# Patient Record
Sex: FEMALE | Race: WHITE
[De-identification: names, ages, dates, MRNs, and addresses within clinical notes are randomized per-mention and may not be internally consistent; named-entity substitution may affect disease eponyms.]

---

## 2017-02-28 ENCOUNTER — HOSPITAL ENCOUNTER (OUTPATIENT)
Dept: HOSPITAL 19 - ZCOL.LAB | Age: 82
End: 2017-02-28
Attending: INTERNAL MEDICINE
Payer: MEDICARE

## 2017-02-28 DIAGNOSIS — R19.7: Primary | ICD-10-CM

## 2017-07-06 ENCOUNTER — HOSPITAL ENCOUNTER (OUTPATIENT)
Dept: HOSPITAL 19 - MC.RAD | Age: 82
End: 2017-07-06
Attending: INTERNAL MEDICINE
Payer: MEDICARE

## 2017-07-06 DIAGNOSIS — Z12.31: Primary | ICD-10-CM

## 2019-03-13 ENCOUNTER — HOSPITAL ENCOUNTER (OUTPATIENT)
Dept: HOSPITAL 19 - COL.ER | Age: 84
Setting detail: OBSERVATION
LOS: 2 days | Discharge: HOME | End: 2019-03-15
Attending: HOSPITALIST | Admitting: HOSPITALIST
Payer: MEDICARE

## 2019-03-13 VITALS — BODY MASS INDEX: 24.96 KG/M2 | HEIGHT: 62.99 IN | WEIGHT: 140.88 LBS

## 2019-03-13 DIAGNOSIS — K21.9: ICD-10-CM

## 2019-03-13 DIAGNOSIS — D72.829: ICD-10-CM

## 2019-03-13 DIAGNOSIS — Z88.8: ICD-10-CM

## 2019-03-13 DIAGNOSIS — N32.81: ICD-10-CM

## 2019-03-13 DIAGNOSIS — F03.90: ICD-10-CM

## 2019-03-13 DIAGNOSIS — S22.31XA: ICD-10-CM
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
## 2019-03-13 DIAGNOSIS — F41.9: ICD-10-CM

## 2019-03-13 DIAGNOSIS — Z90.710: ICD-10-CM

## 2019-03-13 DIAGNOSIS — E87.2: ICD-10-CM

## 2019-03-13 DIAGNOSIS — R19.7: Primary | ICD-10-CM

## 2019-03-13 DIAGNOSIS — Z88.7: ICD-10-CM

## 2019-03-13 DIAGNOSIS — E78.5: ICD-10-CM

## 2019-03-13 DIAGNOSIS — E03.9: ICD-10-CM

## 2019-03-13 DIAGNOSIS — Z88.2: ICD-10-CM

## 2019-03-13 DIAGNOSIS — Z79.82: ICD-10-CM

## 2019-03-13 DIAGNOSIS — I10: ICD-10-CM

## 2019-03-13 DIAGNOSIS — Z88.1: ICD-10-CM

## 2019-03-13 LAB
ALBUMIN SERPL-MCNC: 4.5 GM/DL (ref 3.5–5)
ALP SERPL-CCNC: 103 U/L (ref 50–136)
ALT SERPL-CCNC: 21 U/L (ref 9–52)
ANION GAP SERPL CALC-SCNC: 16 MMOL/L (ref 7–16)
AST SERPL-CCNC: 44 U/L (ref 15–37)
BASOPHILS # BLD: 0 10*3/UL (ref 0–0.2)
BASOPHILS NFR BLD AUTO: 0.3 % (ref 0–2)
BILIRUB SERPL-MCNC: 1.5 MG/DL (ref 0–1)
BUN SERPL-MCNC: 26 MG/DL (ref 7–17)
CALCIUM SERPL-MCNC: 10 MG/DL (ref 8.4–10.2)
CHLORIDE SERPL-SCNC: 100 MMOL/L (ref 98–107)
CO2 SERPL-SCNC: 22 MMOL/L (ref 22–30)
CREAT SERPL-SCNC: 0.99 MG/DL (ref 0.52–1.25)
EOSINOPHIL # BLD: 0 10*3/UL (ref 0–0.7)
EOSINOPHIL NFR BLD: 0 % (ref 0–4)
ERYTHROCYTE [DISTWIDTH] IN BLOOD BY AUTOMATED COUNT: 13.4 % (ref 11.5–14.5)
GLUCOSE SERPL-MCNC: 135 MG/DL (ref 74–106)
GRANULOCYTES # BLD AUTO: 88.6 % (ref 42.2–75.2)
HCT VFR BLD AUTO: 45.5 % (ref 37–47)
HGB BLD-MCNC: 15.4 G/DL (ref 12.5–16)
HYALINE CASTS #/AREA URNS LPF: (no result) /LPF
LYMPHOCYTES # BLD: 0.6 10*3/UL (ref 1.2–3.4)
LYMPHOCYTES NFR BLD: 4.2 % (ref 20–51)
MCH RBC QN AUTO: 28 PG (ref 27–31)
MCHC RBC AUTO-ENTMCNC: 34 G/DL (ref 33–37)
MCV RBC AUTO: 84 FL (ref 80–100)
MONOCYTES # BLD: 1 10*3/UL (ref 0.1–0.6)
MONOCYTES NFR BLD AUTO: 6.6 % (ref 1.7–9.3)
NEUTROPHILS # BLD: 12.8 10*3/UL (ref 1.4–6.5)
PH UR STRIP.AUTO: 5 [PH] (ref 5–8)
PLATELET # BLD AUTO: 338 K/MM3 (ref 130–400)
PMV BLD AUTO: 8.9 FL (ref 7.4–10.4)
POTASSIUM SERPL-SCNC: 3.4 MMOL/L (ref 3.4–5)
PROT SERPL-MCNC: 8.3 GM/DL (ref 6.4–8.2)
RBC # BLD AUTO: 5.44 M/MM3 (ref 4.1–5.3)
RBC # UR STRIP.AUTO: (no result) /UL
RBC # UR: (no result) /HPF
SODIUM SERPL-SCNC: 138 MMOL/L (ref 137–145)
SP GR UR STRIP.AUTO: 1.02 (ref 1–1.03)
SQUAMOUS # URNS: (no result) /HPF
UA DIPSTICK PNL UR STRIP.AUTO: (no result)
URN COLLECT METHOD CLASS: (no result)

## 2019-03-13 PROCEDURE — G0378 HOSPITAL OBSERVATION PER HR: HCPCS

## 2019-03-14 VITALS — TEMPERATURE: 98.5 F | DIASTOLIC BLOOD PRESSURE: 84 MMHG | HEART RATE: 72 BPM | SYSTOLIC BLOOD PRESSURE: 156 MMHG

## 2019-03-14 VITALS — HEART RATE: 82 BPM | DIASTOLIC BLOOD PRESSURE: 79 MMHG | SYSTOLIC BLOOD PRESSURE: 152 MMHG | TEMPERATURE: 99.1 F

## 2019-03-14 VITALS — SYSTOLIC BLOOD PRESSURE: 131 MMHG | HEART RATE: 78 BPM | TEMPERATURE: 98.6 F | DIASTOLIC BLOOD PRESSURE: 60 MMHG

## 2019-03-14 VITALS — HEART RATE: 68 BPM | SYSTOLIC BLOOD PRESSURE: 137 MMHG | DIASTOLIC BLOOD PRESSURE: 63 MMHG | TEMPERATURE: 98.6 F

## 2019-03-14 VITALS — DIASTOLIC BLOOD PRESSURE: 91 MMHG | HEART RATE: 77 BPM | SYSTOLIC BLOOD PRESSURE: 117 MMHG | TEMPERATURE: 98.9 F

## 2019-03-14 VITALS — SYSTOLIC BLOOD PRESSURE: 142 MMHG | TEMPERATURE: 98.2 F | DIASTOLIC BLOOD PRESSURE: 67 MMHG | HEART RATE: 81 BPM

## 2019-03-14 LAB
ANION GAP SERPL CALC-SCNC: 8 MMOL/L (ref 7–16)
BASOPHILS # BLD: 0 10*3/UL (ref 0–0.2)
BASOPHILS NFR BLD AUTO: 0.2 % (ref 0–2)
BUN SERPL-MCNC: 24 MG/DL (ref 7–17)
CALCIUM SERPL-MCNC: 8.9 MG/DL (ref 8.4–10.2)
CHLORIDE SERPL-SCNC: 107 MMOL/L (ref 98–107)
CO2 SERPL-SCNC: 22 MMOL/L (ref 22–30)
CREAT SERPL-SCNC: 0.78 MG/DL (ref 0.52–1.25)
EOSINOPHIL # BLD: 0 10*3/UL (ref 0–0.7)
EOSINOPHIL NFR BLD: 0 % (ref 0–4)
ERYTHROCYTE [DISTWIDTH] IN BLOOD BY AUTOMATED COUNT: 13.6 % (ref 11.5–14.5)
GLUCOSE SERPL-MCNC: 98 MG/DL (ref 74–106)
GRANULOCYTES # BLD AUTO: 73.2 % (ref 42.2–75.2)
HCT VFR BLD AUTO: 37.8 % (ref 37–47)
HGB BLD-MCNC: 12.4 G/DL (ref 12.5–16)
LYMPHOCYTES # BLD: 1.2 10*3/UL (ref 1.2–3.4)
LYMPHOCYTES NFR BLD: 13.2 % (ref 20–51)
MCH RBC QN AUTO: 28 PG (ref 27–31)
MCHC RBC AUTO-ENTMCNC: 33 G/DL (ref 33–37)
MCV RBC AUTO: 86 FL (ref 80–100)
MONOCYTES # BLD: 1.2 10*3/UL (ref 0.1–0.6)
MONOCYTES NFR BLD AUTO: 13 % (ref 1.7–9.3)
NEUTROPHILS # BLD: 6.6 10*3/UL (ref 1.4–6.5)
PLATELET # BLD AUTO: 271 K/MM3 (ref 130–400)
PMV BLD AUTO: 9.4 FL (ref 7.4–10.4)
POTASSIUM SERPL-SCNC: 3.4 MMOL/L (ref 3.4–5)
RBC # BLD AUTO: 4.39 M/MM3 (ref 4.1–5.3)
SODIUM SERPL-SCNC: 137 MMOL/L (ref 137–145)

## 2019-03-14 NOTE — NUR
pt resting in bed A+Ox4. reports "no pain, i just feel crappy". admission
assessment complete. pt reports having incontinence and does not know when she
goes. will monitor for incontinence. bed alarm on, call light in reach.

## 2019-03-14 NOTE — NUR
[employer] student attempted to contact the patient's son in [facility], to complete
intake. Phone was busy, unable to leave message. SW to attempt contact
tomorrow, 3/15. [employer]ERSON student faxed updates to Tabatha at [facility].

## 2019-03-14 NOTE — NUR
PT IN BED WITH HOB ELEVATED TO 45 DEGREE ANGLE. PT DENIES PAIN OR DISCOMFORT,
NO SOB, RESP EVEN AND UNLABORED. CALL LIGHT WITHIN REACH.

## 2019-03-14 NOTE — NUR
Patient has been doing well today. She continues to be weak. She is having
liquid stool. She has been in the most the day. PT got patient up to the
chair. She sat up for a few hours. No complaints of nausea or pain today. She
was excited to eat solid foods. No other changes at this time. Call light
within reach.

## 2019-03-15 VITALS — DIASTOLIC BLOOD PRESSURE: 64 MMHG | SYSTOLIC BLOOD PRESSURE: 151 MMHG | HEART RATE: 61 BPM | TEMPERATURE: 97.9 F

## 2019-03-15 VITALS — HEART RATE: 59 BPM | DIASTOLIC BLOOD PRESSURE: 64 MMHG | SYSTOLIC BLOOD PRESSURE: 140 MMHG | TEMPERATURE: 98.6 F

## 2019-03-15 VITALS — HEART RATE: 67 BPM | SYSTOLIC BLOOD PRESSURE: 111 MMHG | DIASTOLIC BLOOD PRESSURE: 59 MMHG | TEMPERATURE: 99.7 F

## 2019-03-15 VITALS — SYSTOLIC BLOOD PRESSURE: 152 MMHG | DIASTOLIC BLOOD PRESSURE: 73 MMHG | HEART RATE: 61 BPM | TEMPERATURE: 98.3 F

## 2019-03-15 LAB
ANION GAP SERPL CALC-SCNC: 7 MMOL/L (ref 7–16)
BASOPHILS # BLD: 0 10*3/UL (ref 0–0.2)
BASOPHILS NFR BLD AUTO: 0.5 % (ref 0–2)
BUN SERPL-MCNC: 16 MG/DL (ref 7–17)
CALCIUM SERPL-MCNC: 8.6 MG/DL (ref 8.4–10.2)
CHLORIDE SERPL-SCNC: 111 MMOL/L (ref 98–107)
CO2 SERPL-SCNC: 22 MMOL/L (ref 22–30)
CREAT SERPL-SCNC: 0.68 MG/DL (ref 0.52–1.25)
EOSINOPHIL # BLD: 0.1 10*3/UL (ref 0–0.7)
EOSINOPHIL NFR BLD: 0.9 % (ref 0–4)
ERYTHROCYTE [DISTWIDTH] IN BLOOD BY AUTOMATED COUNT: 14 % (ref 11.5–14.5)
GLUCOSE SERPL-MCNC: 92 MG/DL (ref 74–106)
GRANULOCYTES # BLD AUTO: 60.2 % (ref 42.2–75.2)
HCT VFR BLD AUTO: 33.3 % (ref 37–47)
HGB BLD-MCNC: 10.9 G/DL (ref 12.5–16)
LYMPHOCYTES # BLD: 1.6 10*3/UL (ref 1.2–3.4)
LYMPHOCYTES NFR BLD: 20.5 % (ref 20–51)
MCH RBC QN AUTO: 29 PG (ref 27–31)
MCHC RBC AUTO-ENTMCNC: 33 G/DL (ref 33–37)
MCV RBC AUTO: 88 FL (ref 80–100)
MONOCYTES # BLD: 1.4 10*3/UL (ref 0.1–0.6)
MONOCYTES NFR BLD AUTO: 17.6 % (ref 1.7–9.3)
NEUTROPHILS # BLD: 4.7 10*3/UL (ref 1.4–6.5)
PLATELET # BLD AUTO: 223 K/MM3 (ref 130–400)
PMV BLD AUTO: 9.1 FL (ref 7.4–10.4)
POTASSIUM SERPL-SCNC: 3.1 MMOL/L (ref 3.4–5)
RBC # BLD AUTO: 3.79 M/MM3 (ref 4.1–5.3)
SODIUM SERPL-SCNC: 139 MMOL/L (ref 137–145)

## 2019-03-15 NOTE — NUR
PT AWAKE IN BED WITH HOB ELEVATED TO 45 DEGREE ANGLE. CHANGED IV TO 20G TO
LEFT AC, IV IN LEFT HAND CAME OUT ON ITS ON, BUT AREA WAS RED AND HARD, NO
BLEEDING NOTED. PT DENIED PAIN OR DISCOMFORT, RESP EVEN AND UNLABORED. CARE
TAKEN OVER BY OUMAR ALLEN. CALL LIGHT WITHIN REACH AND BED ALARM ON.

## 2019-03-15 NOTE — NUR
PT HAS NO NAUSEA OR VOMITING SO FAR THIS SHIFT. PT HAS A COUPLE OF EPISODES OF
DIARRHEA. PT'S BOTTOM IS BRIGHT RED AND SORE. APPLIED BARRIER CREAM TO BOTTOM.
PT WAS ALSO GIVEN A SNACK THIS NIGHT. PT IS STILL WEAK WHEN TRYING TO GET UP
TO BEDSIDE COMMODE, SHE IS A X2 ASSIST. PT DOES ASSIST WITH ROLLING IN BED. PT
HAS BEEN INCONTINENT X2 WITH A LARGE AMOUNT OF LOOSE WATERY STOOLS. PT HAS
DISCOMFORT FROM HER BOTTOM BEING RED FROM THE DIARRHEA. PT IS SITTING UP IN
BED EATING SNACK. CALL LIGHT IN REACH AND BED ALARM IS ON.

## 2019-03-15 NOTE — NUR
LEFT AC INT DISCONTINUED PER PENDING DISCHARGE. PATIENT TOLERATED WELL.
DISCHARGE INSTRUCTIONS REVIEWED WITH PATIENT. ALL QUESTIONS ANSWERED. PATIENT
PERSONAL BELONGINGS GATHERED.

## 2019-03-15 NOTE — NUR
HERSON spoke with Rima at [facility] who says they can accept patient back today to
[facility].  HERSON set up transport time for 2:30p.
Patient will have outpatient part b therapy while there.  HERSON faxed discharge
and attempted to call son to inform him.  [facility] has talked with him and he is
aware of patients dc and return there today.  No other needs identified at
this time.

## 2019-03-15 NOTE — NUR
PATIENT IS A&O. VSS. BOWEL SOUNDS ACTIVE ALL FOUR QUADRANTS. PATIENT
TOLERATING FOOD & LIQUIDS WITHOUT ANY COMPLAINTS OF VOMITING. PATIENT STATES
THAT SHE HAS SOME NAUSEA, BUT DENIES THE NEED FOR NAUSEA MEDICATION.
BILATERAL UPPER LOBES AND LEFT LOWER LUNG LOBES CLEAR UPON AUSCULTATION. FINE
CRACKLES AUSCULATATED OVER RIGHT LOWER LUNG LOBE. PATIENT DENIES PRODUCTIVE
COUGH OR SHORTNESS OF BREATH. GENERALIZED WEAKNESS NOTED. POSITIVE PEDAL
PULSES EQUAL BILATERALLY. 2+ PITTING-EDEMA TO BLE. GROIN AND SACRUM REDDENED.
IV FLUIDS INFUSING TO LEFT AC IV VIA PUMP. CALL LIGHT WITHIN REACH. PATIENT
DENIES PAIN THIS MORNING. NO OTHER NEEDS AT THIS TIME.

## 2019-03-15 NOTE — NUR
PT SLEEP/RESTING WITH EYES CLOSED. PT HAS BEEN CHANGED AND REPOSITIONED IN
BED. NO S/S OF PAIN OR DISCOMFORT, RESP EVEN AND UNLABORED, CALL LIGHT WITHIN
REACH. BED ALARM ON.

## 2019-07-19 ENCOUNTER — HOSPITAL ENCOUNTER (EMERGENCY)
Dept: HOSPITAL 19 - COL.ER | Age: 84
Discharge: HOME | End: 2019-07-19
Payer: MEDICARE

## 2019-07-19 VITALS — TEMPERATURE: 97.8 F

## 2019-07-19 VITALS — WEIGHT: 135.36 LBS | HEIGHT: 62.99 IN | BODY MASS INDEX: 23.98 KG/M2

## 2019-07-19 VITALS — DIASTOLIC BLOOD PRESSURE: 68 MMHG | SYSTOLIC BLOOD PRESSURE: 114 MMHG | HEART RATE: 69 BPM

## 2019-07-19 DIAGNOSIS — S30.0XXA: Primary | ICD-10-CM

## 2019-07-19 DIAGNOSIS — Z79.82: ICD-10-CM

## 2019-07-19 DIAGNOSIS — F03.90: ICD-10-CM
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
## 2019-07-19 DIAGNOSIS — S70.01XA: ICD-10-CM

## 2019-07-19 DIAGNOSIS — K21.9: ICD-10-CM

## 2019-07-19 DIAGNOSIS — I10: ICD-10-CM

## 2019-07-19 DIAGNOSIS — S70.02XA: ICD-10-CM
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
## 2019-07-19 LAB
ALBUMIN SERPL-MCNC: 4.3 GM/DL (ref 3.5–5)
ALP SERPL-CCNC: 150 U/L (ref 50–136)
ALT SERPL-CCNC: 8 U/L (ref 9–52)
ANION GAP SERPL CALC-SCNC: 13 MMOL/L (ref 7–16)
AST SERPL-CCNC: 28 U/L (ref 15–37)
BILIRUB SERPL-MCNC: 1.1 MG/DL (ref 0–1)
BUN SERPL-MCNC: 23 MG/DL (ref 7–17)
CALCIUM SERPL-MCNC: 9.5 MG/DL (ref 8.4–10.2)
CHLORIDE SERPL-SCNC: 99 MMOL/L (ref 98–107)
CO2 SERPL-SCNC: 20 MMOL/L (ref 22–30)
CREAT SERPL-SCNC: 0.9 UMOL/L (ref 0.52–1.25)
EOSINOPHIL NFR BLD: 1 % (ref 0–4)
ERYTHROCYTE [DISTWIDTH] IN BLOOD BY AUTOMATED COUNT: 13.6 % (ref 11.5–14.5)
GLUCOSE SERPL-MCNC: 103 MG/DL (ref 74–106)
HCT VFR BLD AUTO: 39.1 % (ref 37–47)
HGB BLD-MCNC: 13.3 G/DL (ref 12.5–16)
LYMPHOCYTES NFR BLD MANUAL: 19 % (ref 20–51)
MCH RBC QN AUTO: 29 PG (ref 27–31)
MCHC RBC AUTO-ENTMCNC: 34 G/DL (ref 33–37)
MCV RBC AUTO: 85 FL (ref 80–100)
MICROCYTES BLD QL SMEAR: (no result)
MONOCYTES NFR BLD: 5 % (ref 1.7–9.3)
NEUTS BAND NFR BLD: 4 % (ref 0–10)
NEUTS SEG NFR BLD MANUAL: 71 % (ref 42–75.2)
PLATELET # BLD AUTO: 349 K/MM3 (ref 130–400)
PLATELET BLD QL SMEAR: NORMAL
PMV BLD AUTO: 8.6 FL (ref 7.4–10.4)
POTASSIUM SERPL-SCNC: 4.4 MMOL/L (ref 3.4–5)
PROT SERPL-MCNC: 7.9 GM/DL (ref 6.4–8.2)
RBC # BLD AUTO: 4.61 M/MM3 (ref 4.1–5.3)
SODIUM SERPL-SCNC: 133 MMOL/L (ref 137–145)

## 2019-08-01 ENCOUNTER — HOSPITAL ENCOUNTER (OUTPATIENT)
Dept: HOSPITAL 19 - COL.RAD | Age: 84
End: 2019-08-01
Attending: INTERNAL MEDICINE
Payer: MEDICARE

## 2019-08-01 DIAGNOSIS — M47.816: ICD-10-CM

## 2019-08-01 DIAGNOSIS — M41.86: Primary | ICD-10-CM
